# Patient Record
Sex: FEMALE | URBAN - METROPOLITAN AREA
[De-identification: names, ages, dates, MRNs, and addresses within clinical notes are randomized per-mention and may not be internally consistent; named-entity substitution may affect disease eponyms.]

---

## 2024-03-11 ENCOUNTER — OFFICE VISIT (OUTPATIENT)
Dept: DENTISTRY | Facility: CLINIC | Age: 39
End: 2024-03-11

## 2024-03-11 VITALS — SYSTOLIC BLOOD PRESSURE: 123 MMHG | HEART RATE: 101 BPM | DIASTOLIC BLOOD PRESSURE: 82 MMHG

## 2024-03-11 DIAGNOSIS — K04.02 IRREVERSIBLE PULPITIS: Primary | ICD-10-CM

## 2024-03-11 PROCEDURE — D0220 INTRAORAL - PERIAPICAL FIRST RADIOGRAPHIC IMAGE: HCPCS

## 2024-03-11 PROCEDURE — D7140 EXTRACTION, ERUPTED TOOTH OR EXPOSED ROOT (ELEVATION AND/OR FORCEPS REMOVAL): HCPCS

## 2024-03-11 RX ORDER — IBUPROFEN 600 MG/1
600 TABLET ORAL EVERY 6 HOURS PRN
COMMUNITY
Start: 2024-02-05 | End: 2025-02-04

## 2024-03-11 RX ORDER — CLOBETASOL PROPIONATE 0.5 MG/G
CREAM TOPICAL 2 TIMES DAILY
COMMUNITY
Start: 2023-11-28

## 2024-03-11 RX ORDER — PHENTERMINE HYDROCHLORIDE 15 MG/1
15 CAPSULE ORAL EVERY MORNING
COMMUNITY
Start: 2024-02-05

## 2024-03-11 NOTE — DENTAL PROCEDURE DETAILS
"EXT #19  Marsha came to clinic with CC \"I have pain on lower left side on my broken tooth\".   Med hx rvd: Anxiety, Smokes 0.5 pack x 20 years, ASA II.  Pain level: 10/10            Onset: Pain started Saturday, was worse yesterday   P: Worse on eating and drinking   Q: Throbbing   R: Radiates to ear, gives patient headache  S: 10/10 pain, tylenol helps    O/E: WNL  E/O: WNL  I/O: MB fracture, Perc Abnormal, Palp WNL, Cold Abnormal  Radiograph: Radiolucency under MO composite.     Discussed if patient wants to attempt to restore the tooth with RCT, post/core, and crown. Patient said she wishes to have extraction.    Time out to indicate correct tooth planned for extraction.     Universal Protocol  Other Assisting Provider: Yes, Garima (assistant)  Verbal consent obtained? YES  Written consent obtained?  YES  Risks, benefits and alternatives discussed?: YES    Time Out  Immediately prior to the procedure a time out was called: YES  Time Out: 10:15 AM  A time out verifies correct patient, procedure, equipment, support staff and site/side marked as required.  Parent states understanding of procedure being performed: YES  Parent's understanding of procedure matches consent: YES  Procedure consent matches procedure scheduled: YES  Test results available and properly labeled: N/A  Site verified with patient: YES  Radiology Images displayed and confirmed.  If images not available, report reviewed:  YES   Required items - Required blood products, implants, devices and special equipment available: YES  Patient identity confirmed:  YES    Informed consent obtained: Explained to patient risks, benefits, and alternatives and parent opted for extraction.    Tx:  20% benzocaine topical anesthetic was applied ›1 minute    1 Carpule of 2% Lidocaine with 1:100 K epi via TERRY B  0.5 carpule 4% septocaine + 1:100K epi administered buccal infiltration    Protected airway with 4x4 gauze shield. Extracted #19 with straight elevator and " forceps without any complications.     Socket of tooth #19 gently curetted. Irrigated socket with normal saline. Bimanual compression. Hemostasis achieved.    Post-operative instructions given to patient. Advised watch for lip/cheek biting due to local anesthesia, avoiding eating until dissipation of local anesthesia, and Tylenol q4-6h prn discomfort/pain. Patient understands.    Pt was cooperative and left office comfortable and ambulatory    Attending: Dr Nava    NV: Comp Exam (if patient wishes)